# Patient Record
(demographics unavailable — no encounter records)

---

## 2025-01-03 NOTE — PHYSICAL EXAM
[General Appearance - Well Developed] : well developed [Normal Appearance] : normal appearance [Well Groomed] : well groomed [General Appearance - Well Nourished] : well nourished [No Deformities] : no deformities [General Appearance - In No Acute Distress] : no acute distress [Normal Conjunctiva] : the conjunctiva exhibited no abnormalities [Eyelids - No Xanthelasma] : the eyelids demonstrated no xanthelasmas [Respiration, Rhythm And Depth] : normal respiratory rhythm and effort [Exaggerated Use Of Accessory Muscles For Inspiration] : no accessory muscle use [Auscultation Breath Sounds / Voice Sounds] : lungs were clear to auscultation bilaterally [Heart Rate And Rhythm] : heart rate and rhythm were normal [Heart Sounds] : normal S1 and S2 [Murmurs] : no murmurs present [Abdomen Soft] : soft [Abdomen Tenderness] : non-tender [Abdomen Mass (___ Cm)] : no abdominal mass palpated [Abnormal Walk] : normal gait [Gait - Sufficient For Exercise Testing] : the gait was sufficient for exercise testing [Nail Clubbing] : no clubbing of the fingernails [Cyanosis, Localized] : no localized cyanosis [Petechial Hemorrhages (___cm)] : no petechial hemorrhages [Skin Color & Pigmentation] : normal skin color and pigmentation [] : no rash [No Venous Stasis] : no venous stasis [Skin Lesions] : no skin lesions [No Skin Ulcers] : no skin ulcer [No Xanthoma] : no  xanthoma was observed [Oriented To Time, Place, And Person] : oriented to person, place, and time [Affect] : the affect was normal [Mood] : the mood was normal [No Anxiety] : not feeling anxious [FreeTextEntry1] : hoarse

## 2025-01-03 NOTE — REASON FOR VISIT
[Follow-Up - Clinic] : a clinic follow-up of [Abnormal ECG] : an abnormal ECG [Hyperlipidemia] : hyperlipidemia [Hypertension] : hypertension [Medication Management] : Medication management [FreeTextEntry1] : Unable to tolerate statin(lipitor), legs hurt, now on Crestor Htn- controlled  21 Mother , BP good, echo done Oct 2020, LA 41 mm, BETO 33 22  Recent trips to [place] and [place].  Pt stopped Rosuvastatin D/T pain in left forearm 23 NO NEW C/O  1/3/25  fell 10 d/a. BP good. Admits to 2 glasses wine daily.  EKG: NSR with ST-Twave abnormalities. 19  Chronic)

## 2025-01-03 NOTE — ASSESSMENT
[FreeTextEntry1] : HTN- excellent on current med, lost 20 lbs, BP better.  /60 will remove HCTZ, now weekly  Meds  renewed.  HLD- LDL 61, Pt stopped Crestor due muscle aches. Labs were drawn today in Office. Now on QOD dosing.  On it daily, . Increase Crestor to 10 on 1/3/25  Abnormal EKG- Diffuse Tw flattening- most likely due to LVH, seen on stress echo from 2014.  Need to update.  If LVH has increased will screen for storage disorder as she is an [employer].  LVH improved  Cancer Screening- Colonoscopy 6/2015, 1/2020 Dr Simon, yearly Mammogram, GYN yearly  GERD- . Add Gaviscon HS, elevated HOB and f/u with ENT.  Better  Health Maintenance- Had Shingrix x2.  Dx with essential tremor as the cause of her voice changes, saw 3 MDs.  Tried Primidone, CB  Colonoscopy 2020. HAD FLU SHOT AND BIVALENT COVID.2023 and 2024.

## 2025-01-03 NOTE — HISTORY OF PRESENT ILLNESS
[FreeTextEntry1] : GYN Dr Meredith Moran- Dr Ole Borrego Derm- Tristin Phillips GI-Miesha Siomn Neuro- Dr Coy, [facility] PCP- Dr Becky Mckeon @ [facility]   More fatigue Chronic throat pain due to GERD. Is moderately better but still has hoarseness and left throat pain

## 2025-03-19 NOTE — REASON FOR VISIT
[Follow-Up - Clinic] : a clinic follow-up of [Abnormal ECG] : an abnormal ECG [Hyperlipidemia] : hyperlipidemia [Hypertension] : hypertension [Medication Management] : Medication management [FreeTextEntry1] : Unable to tolerate statin(lipitor), legs hurt, now on Crestor Htn- controlled  21 Mother , BP good, echo done Oct 2020, LA 41 mm, BETO 33 22  Recent trips to [place] and [place].  Pt stopped Rosuvastatin D/T pain in left forearm 23 NO NEW C/O  1/3/25  fell 10 d/a. BP good. Admits to 2 glasses wine daily. 3/18/25 woke up in the middle of the night with chest discomfort and palpitations. fit bit went up to 139. resolved after approximately a few hours. has never felt this before. no dizziness. no visual changes. feels okay now -just fatigue. did have 2-3 glasses of wine before bed. follows with PCP Dr Becky Mckeon.   EKG: NSR with ST-Twave abnormalities. 19  Chronic)

## 2025-03-19 NOTE — REASON FOR VISIT
[Follow-Up - Clinic] : a clinic follow-up of [Abnormal ECG] : an abnormal ECG [Hyperlipidemia] : hyperlipidemia [Hypertension] : hypertension [Medication Management] : Medication management [FreeTextEntry1] : Unable to tolerate statin(lipitor), legs hurt, now on Crestor Htn- controlled  21 Mother , BP good, echo done Oct 2020, LA 41 mm, BETO 33 22  Recent trips to [place] and [place].  Pt stopped Rosuvastatin D/T pain in left forearm 23 NO NEW C/O  1/3/25  fell 10 d/a. BP good. Admits to 2 glasses wine daily. 3/18/25 woke up in the middle of the night with chest discomfort and palpitations. fit bit went up to 139. resolved after approximately a few hours. has never felt this before. no dizziness. no visual changes. feels okay now -just fatigue. did have 2-3 glasses of wine before bed. follows with PCP Dr Becky Mckeon.   EKG: NSR with ST-Twave abnormalities. 19  Chronic)

## 2025-03-19 NOTE — ASSESSMENT
[FreeTextEntry1] : palpitations -wearing zio patch today. will get stress echo d/t chest pressure with palpitations-ekg uninterpretable for est. will get ekg at time of sxs if they repeat in the future. labs reviewed from pcp -wnl.   HTN- excellent on current med, lost 20 lbs, BP better.  /60 will remove HCTZ, now weekly  Meds  renewed.  HLD- LDL 61, Pt stopped Crestor due muscle aches. Labs were drawn today in Office. Now on QOD dosing.  On it daily, . Increase Crestor to 10 on 1/3/25  Abnormal EKG- Diffuse Tw flattening- most likely due to LVH, seen on stress echo from 2014.  Need to update.  If LVH has increased will screen for storage disorder as she is an [employer].  LVH improved 3/19/25 will repeat echo   Cancer Screening- Colonoscopy 6/2015, 1/2020 Dr Simon, yearly Mammogram, GYN yearly  GERD- . Add Gaviscon HS, elevated HOB and f/u with ENT.  Better  Health Maintenance- Had Shingrix x2.  Dx with essential tremor as the cause of her voice changes, saw 3 MDs.  Tried Primidone, CB  Colonoscopy 2020. HAD FLU SHOT AND BIVALENT COVID.2023 and 2024.

## 2025-03-19 NOTE — HISTORY OF PRESENT ILLNESS
[FreeTextEntry1] : GYN Dr Meredith Moran- Dr Ole Borrego Derm- Tristin Phillips GI-Miesha Simon Neuro- Dr Coy, [facility] PCP- Dr Becky Mckeon @ [facility]   More fatigue Chronic throat pain due to GERD. Is moderately better but still has hoarseness and left throat pain

## 2025-03-19 NOTE — ASSESSMENT
[FreeTextEntry1] : palpitations -wearing zio patch today. will get stress echo d/t chest pressure with palpitations-ekg uninterpretable for est. will get ekg at time of sxs if they repeat in the future. labs reviewed from pcp -wnl.   HTN- excellent on current med, lost 20 lbs, BP better.  /60 will remove HCTZ, now weekly  Meds  renewed.  HLD- LDL 61, Pt stopped Crestor due muscle aches. Labs were drawn today in Office. Now on QOD dosing.  On it daily, . Increase Crestor to 10 on 1/3/25  Abnormal EKG- Diffuse Tw flattening- most likely due to LVH, seen on stress echo from 2014.  Need to update.  If LVH has increased will screen for storage disorder as she is an [employer].  LVH improved 3/19/25 will repeat echo   Cancer Screening- Colonoscopy 6/2015, 1/2020 Dr Simon, yearly Mammogram, GYN yearly  GERD- . Add Gaviscon HS, elevated HOB and f/u with ENT.  Better  Health Maintenance- Had Shingrix x2.  Dx with essential tremor as the cause of her voice changes, saw 3 MDs.  Tried Primidone, CB  Colonoscopy 2020. HAD FLU SHOT AND BIVALENT COVID.2023 and 2024.

## 2025-03-19 NOTE — HISTORY OF PRESENT ILLNESS
[FreeTextEntry1] : GYN Dr Meredith Moran- Dr Ole Borrego Derm- Tristin Phillips GI-Miesha Simon Neuro- Dr Coy, [facility] PCP- Dr Becky Mckeon @ [facility]   More fatigue Chronic throat pain due to GERD. Is moderately better but still has hoarseness and left throat pain

## 2025-04-02 NOTE — HISTORY OF PRESENT ILLNESS
[FreeTextEntry1] : GYN Dr Meredith Moran- Dr Ole Borrego Derm- Tristin Phillips GI-Miesha Simon Neuro- Dr Coy, [facility] PCP- Dr Becky Mckeon @ [facility]   More fatigue Chronic throat pain due to GERD. Is moderately better but still has hoarseness and left throat pain

## 2025-04-02 NOTE — REASON FOR VISIT
[FreeTextEntry1] : Unable to tolerate statin(lipitor), legs hurt, now on Crestor Htn- controlled  21 Mother , BP good, echo done Oct 2020, LA 41 mm, BETO 33 22  Recent trips to [place] and [place].  Pt stopped Rosuvastatin D/T pain in left forearm 23 NO NEW C/O  1/3/25  fell 10 d/a. BP good. Admits to 2 glasses wine daily. 3/18/25 woke up in the middle of the night with chest discomfort and palpitations. fit bit went up to 139. resolved after approximately a few hours. has never felt this before. no dizziness. no visual changes. feels okay now -just fatigue. did have 2-3 glasses of wine before bed. follows with PCP Dr Becky Mckeon.   EKG: NSR with ST-Twave abnormalities. 19  Chronic) [Follow-Up - Clinic] : a clinic follow-up of [Abnormal ECG] : an abnormal ECG [Hyperlipidemia] : hyperlipidemia [Hypertension] : hypertension [Medication Management] : Medication management

## 2025-04-02 NOTE — PHYSICAL EXAM
[General Appearance - Well Developed] : well developed [Normal Appearance] : normal appearance [Well Groomed] : well groomed [General Appearance - Well Nourished] : well nourished [No Deformities] : no deformities [General Appearance - In No Acute Distress] : no acute distress [Normal Conjunctiva] : the conjunctiva exhibited no abnormalities [Eyelids - No Xanthelasma] : the eyelids demonstrated no xanthelasmas [FreeTextEntry1] : hoarse [Respiration, Rhythm And Depth] : normal respiratory rhythm and effort [Exaggerated Use Of Accessory Muscles For Inspiration] : no accessory muscle use [Auscultation Breath Sounds / Voice Sounds] : lungs were clear to auscultation bilaterally [Heart Rate And Rhythm] : heart rate and rhythm were normal [Heart Sounds] : normal S1 and S2 [Murmurs] : no murmurs present [Abdomen Soft] : soft [Abdomen Tenderness] : non-tender [Abdomen Mass (___ Cm)] : no abdominal mass palpated [Abnormal Walk] : normal gait [Gait - Sufficient For Exercise Testing] : the gait was sufficient for exercise testing [Nail Clubbing] : no clubbing of the fingernails [Cyanosis, Localized] : no localized cyanosis [Petechial Hemorrhages (___cm)] : no petechial hemorrhages [Skin Color & Pigmentation] : normal skin color and pigmentation [] : no rash [No Venous Stasis] : no venous stasis [Skin Lesions] : no skin lesions [No Skin Ulcers] : no skin ulcer [No Xanthoma] : no  xanthoma was observed [Oriented To Time, Place, And Person] : oriented to person, place, and time [Affect] : the affect was normal [Mood] : the mood was normal [No Anxiety] : not feeling anxious

## 2025-04-02 NOTE — ASSESSMENT
[FreeTextEntry1] : palpitations -wearing zio patch today. will get stress echo d/t chest pressure with palpitations-ekg uninterpretable for est. will get ekg at time of sxs if they repeat in the future. labs reviewed from pcp -wnl.   HTN- excellent on current med, lost 20 lbs, BP better.  /60 will remove HCTZ, now weekly  Meds  renewed.  HLD- LDL 61, Pt stopped Crestor due muscle aches. Labs were drawn today in Office. Now on QOD dosing.  On it daily, . Increase Crestor to 10 on 1/3/25  Abnormal EKG- Diffuse Tw flattening- most likely due to LVH, seen on stress echo from 2014.  Need to update.  If LVH has increased will screen for storage disorder as she is an [employer].  LVH improved 3/19/25 will repeat echo   Cancer Screening- Colonoscopy 6/2015, 1/2020 Dr Simon, yearly Mammogram, GYN yearly  GERD- . Add Gaviscon HS, elevated HOB and f/u with ENT.  Better  Health Maintenance- Had Shingrix x2.  Dx with essential tremor as the cause of her voice changes, saw 3 MDs.  Tried Primidone, CB  Colonoscopy 2020. HAD FLU SHOT AND BIVALENT COVID.2023 and 2024.

## 2025-04-02 NOTE — HISTORY OF PRESENT ILLNESS
[FreeTextEntry1] : GYN Dr Meredith Moran- Dr Ole Borrego Derm- Tristin Phillips GI-Miesha Simon Neuro- Dr Coy, [facility] PCP- Dr Becky Mckeon @ [facility]   More fatigue Chronic throat pain due to GERD. Is moderately better but still has hoarseness and left throat pain

## 2025-04-02 NOTE — REASON FOR VISIT
[FreeTextEntry1] : Unable to tolerate statin(lipitor), legs hurt, now on Crestor Htn- controlled  21 Mother , BP good, echo done Oct 2020, LA 41 mm, BETO 33 22  Recent trips to [place] and [place].  Pt stopped Rosuvastatin D/T pain in left forearm 23 NO NEW C/O  1/3/25  fell 10 d/a. BP good. Admits to 2 glasses wine daily. 3/18/25 woke up in the middle of the night with chest discomfort and palpitations. fit bit went up to 139. resolved after approximately a few hours. has never felt this before. no dizziness. no visual changes. feels okay now -just fatigue. did have 2-3 glasses of wine before bed. follows with PCP Dr Becky Mckeon.   EKG: NSR with ST-Twave abnormalities. 19  Chronic) [Follow-Up - Clinic] : a clinic follow-up of [Abnormal ECG] : an abnormal ECG [Hyperlipidemia] : hyperlipidemia [Hypertension] : hypertension [Medication Management] : Medication management

## 2025-04-02 NOTE — ASSESSMENT
[FreeTextEntry1] : palpitations -wearing zio patch today. will get stress echo d/t chest pressure with palpitations-ekg uninterpretable for est. will get ekg at time of sxs if they repeat in the future. labs reviewed from pcp -wnl.   HTN- excellent on current med, lost 20 lbs, BP better.  /60 will remove HCTZ, now weekly  Meds  renewed.  HLD- LDL 61, Pt stopped Crestor due muscle aches. Labs were drawn today in Office. Now on QOD dosing.  On it daily, . Increase Crestor to 10 on 1/3/25  Abnormal EKG- Diffuse Tw flattening- most likely due to LVH, seen on stress echo from 2014.  Need to update.  If LVH has increased will screen for storage disorder as she is an [employer].  LVH improved 3/19/25 will repeat echo   Cancer Screening- Colonoscopy 6/2015, 1/2020 Dr Simon, yearly Mammogram, GYN yearly  GERD- . Add Gaviscon HS, elevated HOB and f/u with ENT.  Better  Health Maintenance- Had Shingrix x2.  Dx with essential tremor as the cause of her voice changes, saw 3 MDs.  Tried Primidone, CB  Colonoscopy 2020. HAD FLU SHOT AND BIVALENT COVID.2023 and 2024.

## 2025-04-02 NOTE — PHYSICAL EXAM
[General Appearance - Well Developed] : well developed [Normal Appearance] : normal appearance [Well Groomed] : well groomed [General Appearance - Well Nourished] : well nourished [No Deformities] : no deformities [General Appearance - In No Acute Distress] : no acute distress [Normal Conjunctiva] : the conjunctiva exhibited no abnormalities [Eyelids - No Xanthelasma] : the eyelids demonstrated no xanthelasmas [FreeTextEntry1] : hoarse [Respiration, Rhythm And Depth] : normal respiratory rhythm and effort [Exaggerated Use Of Accessory Muscles For Inspiration] : no accessory muscle use [Auscultation Breath Sounds / Voice Sounds] : lungs were clear to auscultation bilaterally [Heart Rate And Rhythm] : heart rate and rhythm were normal [Heart Sounds] : normal S1 and S2 [Murmurs] : no murmurs present [Abdomen Soft] : soft [Abdomen Tenderness] : non-tender [Abdomen Mass (___ Cm)] : no abdominal mass palpated [Abnormal Walk] : normal gait [Gait - Sufficient For Exercise Testing] : the gait was sufficient for exercise testing [Cyanosis, Localized] : no localized cyanosis [Nail Clubbing] : no clubbing of the fingernails [Petechial Hemorrhages (___cm)] : no petechial hemorrhages [Skin Color & Pigmentation] : normal skin color and pigmentation [] : no rash [No Venous Stasis] : no venous stasis [Skin Lesions] : no skin lesions [No Skin Ulcers] : no skin ulcer [No Xanthoma] : no  xanthoma was observed [Oriented To Time, Place, And Person] : oriented to person, place, and time [Affect] : the affect was normal [Mood] : the mood was normal [No Anxiety] : not feeling anxious

## 2025-04-08 NOTE — ASSESSMENT
[FreeTextEntry1] : 69 yo F with HTN, HLD, years of intermittent palpitation and new onset AF (~6% on 3d Holter 2025) who presents for management of AF.   We discussed the anatomy and pathophysiology of SVT and AF. patient had as [id number] 3 (age, F, HTN) which equates to 3% annual of stroke and therefore needs to stay on Eliquis for stroke prevention. After ablation she does not have recurrent AF after a year, we can consider stopping a/c then.   For AF, we discussed the medical therapy including rate vs. rhythm control medications, ablation (RF, PF) and ILR. Early management of AF control using ablation is now a class 1 indication. her TTE showed moderately dilated LA and given her intermittent palpitations, she could have had pAF a while ago. I believe the best successful permanent treatment for her AF is PFA. We discussed the procedure details, workflow, benefit and risks which can include potential hematoma, vascular injury, pericardial effusion/ tamponade, renal injury, stroke. Because she also has short pSVT that may be difficult to differentiate from AF based on palpitation alone, I recommend ILR implant during AF ablation as well for long term cardiac monitoring. If there is no AF recurring for a year post ablation, will consider stopping Eliquis. She is agreeable to procedure- 4/23/25. She wants to proceed with scheduling the patient but may go for a second opinion at another institution. Followup in 1 months postop.

## 2025-04-08 NOTE — HISTORY OF PRESENT ILLNESS
[FreeTextEntry1] : 70F with HTN, HLD, GERD, palpitation and new onset diagnosis of pAF who presents for evaluation/management of AF.   patient had palpitation for a few years. Now she notices more of the symptoms especially at night time when she is laying in bed. She denies syncope, dizziness, CP and SOB. her brother had AF ablation in his 40s. Patient is very physically active in gym. She notices more with activities.  Patient's holter from 3/2025 for 3 days showed 5.2% AF and some strips cannot rule out rapid AFL, 4 episodes of AT, longest 4 beats, 1 NSVT at 104-153 bpm. started on Eliquis by Yinka Ramirez on 4/2/25.   EKG 1/3/25: SR 3/18/25 EKG: SR 4/2/25 EKG: SR, TWI EKG 4/8/25: SR, TWI  4/2/25 TTE: EF 60%, septal hypertrophy with hypertrophic cardiomyopathy, normal RV, moderately dilated LA, mild MR/mild/mod TR

## 2025-04-08 NOTE — REVIEW OF SYSTEMS
[Fever] : no fever [Chills] : no chills [SOB] : no shortness of breath [Dyspnea on exertion] : not dyspnea during exertion [Lower Ext Edema] : no extremity edema [Syncope] : no syncope [Rash] : no rash [Dizziness] : no dizziness [Easy Bleeding] : no tendency for easy bleeding

## 2025-04-08 NOTE — ASSESSMENT
[FreeTextEntry1] : 71 yo F with HTN, HLD, years of intermittent palpitation and new onset AF (~6% on 3d Holter 2025) who presents for management of AF.   We discussed the anatomy and pathophysiology of SVT and AF. patient had as [id number] 3 (age, F, HTN) which equates to 3% annual of stroke and therefore needs to stay on Eliquis for stroke prevention. After ablation she does not have recurrent AF after a year, we can consider stopping a/c then.   For AF, we discussed the medical therapy including rate vs. rhythm control medications, ablation (RF, PF) and ILR. Early management of AF control using ablation is now a class 1 indication. her TTE showed moderately dilated LA and given her intermittent palpitations, she could have had pAF a while ago. I believe the best successful permanent treatment for her AF is PFA. We discussed the procedure details, workflow, benefit and risks which can include potential hematoma, vascular injury, pericardial effusion/ tamponade, renal injury, stroke. Because she also has short pSVT that may be difficult to differentiate from AF based on palpitation alone, I recommend ILR implant during AF ablation as well for long term cardiac monitoring. If there is no AF recurring for a year post ablation, will consider stopping Eliquis. She is agreeable to procedure- 4/23/25. She wants to proceed with scheduling the patient but may go for a second opinion at another institution. Followup in 1 months postop.

## 2025-04-08 NOTE — PHYSICAL EXAM
[Well Developed] : well developed [Normal Conjunctiva] : normal conjunctiva [Normal S1, S2] : normal S1, S2 [Clear Lung Fields] : clear lung fields [Soft] : abdomen soft [Normal Gait] : normal gait [No Edema] : no edema [No Rash] : no rash [Moves all extremities] : moves all extremities [No Focal Deficits] : no focal deficits [Alert and Oriented] : alert and oriented [Normal memory] : normal memory

## 2025-05-05 NOTE — ASSESSMENT
[FreeTextEntry1] : 70 year old presenting for evaluation of possible VIDA.   Data reviewed:      Snoring Daytime sleepiness HTN Afib  Patient does have clinical signs and symptoms compatible with VIDA. Discussed medical risk factors associated with moderate to severe VIDA including HTN, CAD, stroke among others. Discussed treatment options including PAP therapy, oral appliance therapy, and inspire. Plan to obtain sleep study.  - Sleep study ordered

## 2025-05-05 NOTE — HISTORY OF PRESENT ILLNESS
[Never] : never [TextBox_4] : 70 year old presenting for evaluation of possible VIDA. States that she wakes up feeling tired during the daytime. She does not find herself easily falling asleep during the day but does not feel refreshed in the AM. She does admit to snoring and occasionally has felt apnea like symptoms waking up choking. She has no family hx of VIDA that she is aware of. She had recent ablation and given the new afib with above symptoms she was sent for VIDA eval. [ESS] : 11

## 2025-05-22 NOTE — REVIEW OF SYSTEMS
[Easy Bruising] : a tendency for easy bruising [Fever] : no fever [Chills] : no chills [SOB] : no shortness of breath [Dyspnea on exertion] : not dyspnea during exertion [Chest Discomfort] : no chest discomfort [Lower Ext Edema] : no extremity edema [Palpitations] : no palpitations [Syncope] : no syncope [Rash] : no rash [Dizziness] : no dizziness [Easy Bleeding] : no tendency for easy bleeding

## 2025-05-22 NOTE — ASSESSMENT
[FreeTextEntry1] : 70F with HTN, HLD, GERD, palpitation and pAF s/p PVI/CTI using Affera on 4/23/25 who presents for followup. EKG today showed SR. Her palpitation has resolved although her fitbit still showed intermittent elevate HR (mostly with exertion but also recorded  at 1am, unclear if artifact since there's no rhythm strip). Her ILR was denied by insurance at time of ablation  We discussed the anatomy and pathophysiology of SVT and AF. patient had as [id number] 3 (age, F, HTN) which equates to 3% annual of stroke and therefore needs to stay on Eliquis for stroke prevention. After ablation she does not have recurrent AF after a year, we can consider stopping a/c then as she is very physically active with leroy and marlon  Followup in 6 and 12 months with EM then.

## 2025-05-22 NOTE — DISCUSSION/SUMMARY
[FreeTextEntry1] : peer to peer done today- ILR implant denied at time of AF ablation. can do a 2 wk EM post ablation and if negative, plan for ILR then per insurance  [EKG obtained to assist in diagnosis and management of assessed problem(s)] : EKG obtained to assist in diagnosis and management of assessed problem(s)

## 2025-05-22 NOTE — REASON FOR VISIT
[Symptom and Test Evaluation] : symptom and test evaluation [Arrhythmia/ECG Abnorrmalities] : arrhythmia/ECG abnormalities [FreeTextEntry3] : Radha/ Yinka Ramirez

## 2025-05-22 NOTE — HISTORY OF PRESENT ILLNESS
[FreeTextEntry1] : 70F with HTN, HLD, GERD, palpitation and pAF s/p PVI/CTI using Affera on 4/23/25 who presents for evaluation/management of AF.   patient had palpitation for a few years. Now she notices more of the symptoms especially at night time when she is laying in bed. She denies syncope, dizziness, CP and SOB. her brother had AF ablation in his 40s. Patient is very physically active in gym. She notices more with activities.  Patient's holter from 3/2025 for 3 days showed 5.2% AF and some strips cannot rule out rapid AFL, 4 episodes of AT, longest 4 beats, 1 NSVT at 104-153 bpm. started on Eliquis by Yinka Ramirez on 4/2/25.  She underwent PVI/CTI using Affera on 4/23/25. Now presents for Fu. EKG today showed SR. She reports resolution of palpitation but still experiences fatigue during day time. She is waiting to get her sleep study done for VIDA evaluation by pulmonary.   EKG 5/22/25: SR 60s EKG 1/3/25: SR 3/18/25 EKG: SR 4/2/25 EKG: SR, TWI EKG 4/8/25: SR, TWI  4/2/25 TTE: EF 60%, septal hypertrophy with hypertrophic cardiomyopathy, normal RV, moderately dilated LA, mild MR/mild/mod TR